# Patient Record
Sex: FEMALE | Race: WHITE
[De-identification: names, ages, dates, MRNs, and addresses within clinical notes are randomized per-mention and may not be internally consistent; named-entity substitution may affect disease eponyms.]

---

## 2022-08-28 ENCOUNTER — HOSPITAL ENCOUNTER (EMERGENCY)
Dept: HOSPITAL 97 - ER | Age: 22
Discharge: HOME | End: 2022-08-28
Payer: COMMERCIAL

## 2022-08-28 VITALS — TEMPERATURE: 98.8 F

## 2022-08-28 DIAGNOSIS — O20.0: Primary | ICD-10-CM

## 2022-08-28 DIAGNOSIS — Z3A.12: ICD-10-CM

## 2022-08-28 LAB
BUN BLD-MCNC: 13 MG/DL (ref 7–18)
GLUCOSE SERPLBLD-MCNC: 77 MG/DL (ref 74–106)
HCT VFR BLD CALC: 37.4 % (ref 36–45)
LYMPHOCYTES # SPEC AUTO: 2.3 K/UL (ref 0.7–4.9)
MCV RBC: 85.2 FL (ref 80–100)
PMV BLD: 7.2 FL (ref 7.6–11.3)
POTASSIUM SERPL-SCNC: 3.5 MMOL/L (ref 3.5–5.1)
RBC # BLD: 4.39 M/UL (ref 3.86–4.86)
SP GR UR: 1.02 (ref 1–1.03)

## 2022-08-28 PROCEDURE — 85025 COMPLETE CBC W/AUTO DIFF WBC: CPT

## 2022-08-28 PROCEDURE — 76817 TRANSVAGINAL US OBSTETRIC: CPT

## 2022-08-28 PROCEDURE — 84702 CHORIONIC GONADOTROPIN TEST: CPT

## 2022-08-28 PROCEDURE — 81003 URINALYSIS AUTO W/O SCOPE: CPT

## 2022-08-28 PROCEDURE — 99284 EMERGENCY DEPT VISIT MOD MDM: CPT

## 2022-08-28 PROCEDURE — 86900 BLOOD TYPING SEROLOGIC ABO: CPT

## 2022-08-28 PROCEDURE — 36415 COLL VENOUS BLD VENIPUNCTURE: CPT

## 2022-08-28 PROCEDURE — 86901 BLOOD TYPING SEROLOGIC RH(D): CPT

## 2022-08-28 PROCEDURE — 81025 URINE PREGNANCY TEST: CPT

## 2022-08-28 PROCEDURE — 80048 BASIC METABOLIC PNL TOTAL CA: CPT

## 2022-08-28 NOTE — XMS REPORT
Continuity of Care Document

                           Created on:2022



Patient:RAMILA LOPEZ

Sex:Female

:2000

External Reference #:[id number]





Demographics







                          Address                   [address]



                                                    [place] [address]

 

                          Mobile Phone              [phone]

 

                          Email Address             [email or web address]

 

                          Preferred Language        en

 

                          Marital Status            Single

 

                          Taoist Affiliation     Unknown

 

                          Race                      White

 

                          Ethnic Group              Not  or 









Author







                          Organization              [facility]

t

 

                          Address                   [address]



                                                     [address]

 

                          Phone                     [phone]









Support







                Name            Relationship    Address         Phone

 

                Gerardo Weeks    Other           Unavailable     [phone]









Care Team Providers







                    Name                Role                Phone

 

                    Kamilla Adan Primary Care Physician [phone]

 

                    Morales HIDALGO, Elham GARCIA Attending Clinician Unavailable

 

                    Naomi Cervantes Attending Clinician [phone]

 

                    Ultrasound, [facility] Attending Clinician Unavailable

 

                    Juana Caldera MD Attending Clinician [phone]

[phone]

 

                    JUANA CALDERA Attending Clinician Unavailable

 

                    KAMILLA REBOLLEDO Attending Clinician Unavailable

 

                    Kamilla Adan Attending Clinician [phone]

[phone]









Payers







           Payer Name Policy Type Policy Number Effective Date Expiration Date S

ource

 

           [employer]            [id number]  2022 00:00:00            







Problems







       Condition Condition Condition Status Onset  Resolution Last   Treating Co

mments 

Source



       Name   Details Category        Date   Date   Treatment Clinician        



                                                 Date                 

 

       Nausea and Nausea and Disease Active                              U

nivers



       vomiting vomiting               8-11                               ity of



       during during               00:00:                             [place]



       pregnancy pregnancy               00                                 [facility]

 

       GBS (group GBS (group Disease Active                       Overview

: Univers



       B      B                    7-18                        Formattin ity of



       streptococ streptococ               00:00:                      g of this

 [place]



       cus) UTI cus) UTI               00                          note   Medica

l



       complicati complicati                                           might be 

Branch



       ng     ng                                               different 



       pregnancy pregnancy                                           from the 



                                                               original. 



                                                               pending 



                                                               brittany    

 

       Supervisio Supervisio Disease Active                              U

nivers



       n of   n of                 7-14                               ity of



       high-risk high-risk               00:00:                             [place]

[place]



       pregnancy pregnancy               00                                 [facility]

 

       History of History of Disease Active                       Overview

: Univers



       depression depression               7-14                        Formattin

 ity of



                                   00:00:                      g of this [place]



                                                             note   Medical



                                                               might be Branch



                                                               different 



                                                               from the 



                                                               original. 



                                                               Not on 



                                                               meds,  



                                                               does not 



                                                               desires 



                                                               meds at 



                                                               this time 

 

       History of History of Disease Active                       Overview

: Univers



       anxiety anxiety               7-14                        Formattin ity o

f



                                   00:00:                      g of this [place]



                                                             note   Medical



                                                               might be Branch



                                                               different 



                                                               from the 



                                                               original. 



                                                               Not on 



                                                               meds,  



                                                               does not 



                                                               desire 



                                                               meds at 



                                                               this time 







Allergies, Adverse Reactions, Alerts







       Allergy Allergy Status Severity Reaction(s) Onset  Inactive Treating Comm

ents 

Source



       Name   Type                        Date   Date   Clinician        

 

       NO KNOWN Drug   Active                                           Univers



       ALLERGIE Class                                                   ity of



       S                                                              [facility]







Social History







           Social Habit Start Date Stop Date  Quantity   Comments   Source

 

           ASSERTION  2022            Pregnant              [facility]



                      00:00:00                                    [facility]

 

           History of                       User of smokeless            Univers

ity of



           tobacco use                       tobacco               [facility]

 

           Exposure to 2022 Not sure              [facility]



           SARS-CoV-2 00:00:00   09:43:00                         [facility]



           (event)                                                Branch

 

           Tobacco use and 2022 Former smokeless            Uni

versity of



           exposure   00:00:00   00:00:00   tobacco user            [facility]

l



                                                                  [place]

 

           Alcohol intake 2022 Ex-drinker            [facility]



                      00:00:00   00:00:00   (finding)             [facility]

 

           Sex Assigned At [birth year]                       Universit

y of



           Birth      00:00:00   00:00:00                         [facility]









                Smoking Status  Start Date      Stop Date       Source

 

                Never smoked tobacco                                 [facility]







Medications







       Ordered Filled Start  Stop   Current Ordering Indication Dosage Frequency

 Signature

                    Comments            Components          Source



     Medication Medication Date Date Medication? Clinician                (SIG) 

          



     Name Name                                                   

 

     proMETHazin            Yes       [id number] 25mg      Take 1           

Univers



     e 25 mg      8-11                               tablet by           ity of



     tablet      00:00:                               mouth           [place]



               00                                 every 6           Medical



                                                  (six)           Branch



                                                  hours as           



                                                  needed for           



                                                  Nausea and           



                                                  Vomiting           



                                                  (N/V).           

 

     proMETHazin            Yes       [id number] 25mg      Take 1           

Univers



     e 25 mg      8-11                               tablet by           ity of



     tablet      00:00:                               mouth           [place]



               00                                 every 6           Medical



                                                  (six)           Branch



                                                  hours as           



                                                  needed for           



                                                  Nausea and           



                                                  Vomiting           



                                                  (N/V).           

 

     proMETHazin      2022-0      Yes       [id number] 25mg      Take 1           

Univers



     e 25 mg      8-11                               tablet by           ity of



     tablet      00:00:                               mouth           [place]



               00                                 every 6           Medical



                                                  (six)           Branch



                                                  hours as           



                                                  needed for           



                                                  Nausea and           



                                                  Vomiting           



                                                  (N/V).           

 

     proMETHazin            Yes       [id number] 25mg      Take 1           

Univers



     e 25 mg      8-11                               tablet by           ity of



     tablet      00:00:                               mouth           [place]



               00                                 every 6           Medical



                                                  (six)           Branch



                                                  hours as           



                                                  needed for           



                                                  Nausea and           



                                                  Vomiting           



                                                  (N/V).           

 

     prenatal            Yes       [id number] 1{packe      Take 1           

Univers



     vit       7-25                     t}        Packet by           ity of



     33-iron-fol      00:00:                               mouth in           [place]

[place]



     ic-dha      00                                 the            Medical



     (SELECT-OB                                         morning.           Branc

h



     + DHA) 29                                                        



     mg iron-1                                                        



     mg -250 mg                                                        



     combo pack                                                        

 

     prenatal            Yes       [id number] 1{packe      Take 1           

Univers



     vit       7-25                     t}        Packet by           ity of



     33-iron-fol      00:00:                               mouth in           [place]

[place]



     ic-dha      00                                 the            Medical



     (SELECT-OB                                         morning.           Branc

h



     + DHA) 29                                                        



     mg iron-1                                                        



     mg -250 mg                                                        



     combo pack                                                        

 

     prenatal            Yes       [id number] 1{packe      Take 1           

Univers



     vit       7-25                     t}        Packet by           ity of



     33-iron-fol      00:00:                               mouth in           Te

[place]



     ic-dha      00                                 the            Medical



     (SELECT-OB                                         morning.           Branc

h



     + DHA) 29                                                        



     mg iron-1                                                        



     mg -250 mg                                                        



     combo pack                                                        

 

     prenatal            Yes       [id number] 1{packe      Take 1           

Univers



     vit       7-25                     t}        Packet by           ity of



     33-iron-fol      00:00:                               mouth in           Te

[place]



     ic-dha      00                                 the            Medical



     (SELECT-OB                                         morning.           Branc

h



     + DHA) 29                                                        



     mg iron-1                                                        



     mg -250 mg                                                        



     combo pack                                                        







Vital Signs







             Vital Name   Observation Time Observation Value Comments     Source

 

             Systolic blood 2022 14:42:00 116 mm[Hg]                Univer

sity of



             [facility]

 

             Diastolic blood 2022 14:42:00 72 mm[Hg]                 [facility]

[facility]



             [facility]

 

             Heart rate   2022 14:42:00 85 /min                   Universi

[facility]

 

             Body temperature 2022 14:42:00 36.17 Katty                 [facility]

 

             Respiratory rate 2022 14:42:00 18 /min                   [facility]

 

             Body height  2022 14:42:00 144.8 cm                  [facility]

 

             Body weight  2022 14:42:00 44.453 kg                 [facility]

 

             BMI          2022 14:42:00 21.21 kg/m2               [facility]







Procedures







                Procedure       Date / Time Performed Performing Clinician Sourc

e

 

                POCT URINALYSIS 2022 00:00:00 [facility]







Encounters







        Start   End     Encounter Admission Attending Care    Care    Encounter 

Source



        Date/Time Date/Time Type    Type    Clinicians Facility Department ID   

   

 

        2022-10-12 2022-10-12 Outpatient R               [facility]    [id number]

-20 Univers



        10:30:00 10:30:00                                         [id number]  [facility]

 

        2022-10-12 2022-10-12 Outpatient P               [facility]    [id number]

[id number] Univers



        10:30:00 10:30:00                                                 [facility]

 

        2022 Nurse           MICHAELA Nielsen    [id number]

21 Univers



        00:00:00 00:00:00 Triage          Elham ZAMORA   350.1.13.10       

  ity [facility] [id number]         Jose Miguel

as



                                                        [phone]         [address]

 

        2022 Abstract         [facility]    [id number]

[id number] Univers



        00:00:00 00:00:00                 Naomi FARMER OB/GYN  [id number]        

 ity of



                                                REGIONAL [id number]         [place] Miguel

as



                                                MATERNAL [phone]         Med

ical



                                                & CHILD [address] [place] [address]                 

 

        2022 Technician         Ultrasound, [facility]    [id number]

[id number] 

Univers



        09:15:00 09:45:00 Visit           Juana Caldera OB/GYN  350.1.

13.10         ity of



                                                REGIONAL [id number]         Jose Miguel

as



                                                MATERNAL [phone]         Med

ical



                                                & CHILD [address]             [facility]                 

 

        2022 Outpatient P       OMERE,  [facility]    [id number]

619 Univers



        09:15:00 09:29:35                 CHASEY                          ity of



                                                                        [facility]

 

        2022 Outpatient R       KESHA, [facility]    [id number]

[id number] Univers



        09:45:00 10:05:22                 KAMILLAELY jeffersy o

f



                                                                        [facility]

 

        2022 Routine         [place], [facility]    [id number]

[id number] Univers



        09:45:00 10:05:22 Prenatal         Kamilla C OB/GYN  350.1.13.10       

  ity of



                        Visit                   REGIONAL [id number]         Jose Miguel

as



                                                MATERNAL [phone]         Med

ical



                                                & [facility] [address]             [facility]                 







Results







           Test Description Test Time  Test Comments Results    Result Comments 

Source









                    POCT URINALYSIS W SPECIFIC GRAVITY 2022 14:51:00 









                      Test Item  Value      Reference Range Interpretation Comme

nts









             POCT U SP GRAV (test code = 3255) .            1.005-1.025         

      

 

             POCT PH U (test code = 3254) .            5-8                      

 

 

             POCT U LEUK EST (test code = 3263) .            Negative - Negative

              

 

             POCT U NIT (test code = 3262) .            Negative - Negative     

         

 

             POCT U PROT (test code = 3259) Trace        Negative - Negative    

          

 

             POCT U GLU (test code = 3256) Negative     Negative - Negative     

         

 

             POCT U KETONE (test code = 3258) .            Negative - Negative  

            

 

             POCT U UROBILI (test code = 3260) .            0.2-1               

      

 

             POCT U BILI (test code = 3261) .            Negative - Negative    

          

 

             POCT U BLD (test code = 3257) .            Negative - Negative     

         

 

             POCT U COLOR (test code = 3266) .                                  

    

 

             POCT U APPEAR (test code = 3267) .                                 

     



[facility]

## 2022-08-28 NOTE — ER
Nurse's Notes                                                                                     

 [facility]                                                                 

Name: Lilia Macedo                                                                               

Age: 22 yrs                                                                                       

Sex: Female                                                                                       

: 2000                                                                                   

MRN: [id number]                                                                                   

Arrival Date: 2022                                                                          

Time: 18:48                                                                                       

Account#: [id number]                                                                            

Bed 8                                                                                             

Private MD:                                                                                       

Diagnosis: Threatened                                                                     

                                                                                                  

Presentation:                                                                                     

                                                                                             

19:08 Chief complaint: Patient states: light spotting. 13 weeks pregnant. lower back pain     lg3 

      starting around 0800 this morning. last OB visit 22. Coronavirus screen: Client        

      denies travel out of the [place] in the last 14 days. At this time, the client does not        

      indicate any symptoms associated with coronavirus-19. Ebola Screen: No symptoms or          

      risks identified at this time. Initial Sepsis Screen:. Initial Sepsis Screen: Does the      

      patient meet any 2 criteria? No. Patient's initial sepsis screen is negative. Does the      

      patient have a suspected source of infection? No. Patient's initial sepsis screen is        

      negative. Risk Assessment: Do you want to hurt yourself or someone else? Patient            

      reports no desire to harm self or others. Onset of symptoms was 2022.            

19:08 Method Of Arrival: Ambulatory                                                           lg3 

19:08 Acuity: MADELEINE 3                                                                           lg3 

                                                                                                  

Triage Assessment:                                                                                

19:11 General: Appears in no apparent distress. comfortable, Behavior is calm, cooperative.   lg3 

      Pain: Complains of pain in back and pelvis. EENT: No deficits noted. No signs and/or        

      symptoms were reported regarding the EENT system. Neuro: No deficits noted. Level of        

      Consciousness is awake, alert, obeys commands, Oriented to person, place, time,             

      situation. Cardiovascular: No deficits noted. Denies chest pain, shortness of breath,       

      Capillary refill < 3 seconds Clubbing of nail beds is absent JVD is absent Patient's        

      skin is warm and dry. Respiratory: No deficits noted. Airway is patent Respiratory          

      effort is even, unlabored, Respiratory pattern is regular, symmetrical. GI: Abd is soft     

      X 4 quads Abd is non tender X 4 quads. : Reports vaginal bleeding that is spotty.         

      Derm: No deficits noted. No signs and/or symptoms reported regarding the dermatologic       

      system. Skin is intact, is healthy with good turgor, Skin is dry, Skin temperature is       

      warm. Musculoskeletal: No deficits noted. No signs and/or symptoms reported regarding       

      the musculoskeletal system. Circulation, motion, and sensation intact. Range of motion:     

      intact in all extremities.                                                                  

                                                                                                  

OB/GYN:                                                                                           

19:11 LMP 2022                                                                              lg3 

23:25  2,  0, Living 1, LMP 2022                                             kb  

                                                                                                  

Historical:                                                                                       

- Allergies:                                                                                      

19:11 No Known Allergies;                                                                     lg3 

- Home Meds:                                                                                      

19:11 Prenatal Vitamin Oral tab [Active]; Folic Acid Oral [Active];                           lg3 

- PMHx:                                                                                           

19:11 None;                                                                                   lg3 

- PSHx:                                                                                           

19:11 None;                                                                                   lg3 

                                                                                                  

- Immunization history:: Adult Immunizations up to date, Client reports having NOT                

  received the Covid vaccine.                                                                     

- Social history:: Smoking status: Patient/guardian denies using tobacco,                         

  Patient/guardian denies using alcohol, street drugs, IV drugs.                                  

                                                                                                  

                                                                                                  

Screenin:25 Abuse screen: Denies threats or abuse. Denies injuries from another. Nutritional        tw5 

      screening: No deficits noted. Tuberculosis screening: No symptoms or risk factors           

      identified. Fall Risk None identified.                                                      

                                                                                                  

Assessment:                                                                                       

19:25 General: Appears in no apparent distress. Behavior is calm, cooperative, appropriate    tw5 

      for age. General: Reports "I started bleeding around 8 this morning so I called my          

      clinic but they aren't open so they told me if the bleeding continued to come to the        

      ER. I have had spotting off and on all day, I am just really worried.". Pain: Complains     

      of pain in left lower quadrant Pain currently is 4 out of 10 on a pain scale. Pain:         

      Quality of pain is described as crampy. Cardiovascular: No deficits noted. Respiratory:     

      No deficits noted.                                                                          

20:19 Reassessment: Patient appears in no apparent distress at this time. Patient and/or      hb  

      family updated on plan of care and expected duration. Pain level reassessed. Patient is     

      alert, oriented x 3, equal unlabored respirations, skin warm/dry/pink.                      

21:45 Reassessment: Patient appears in no apparent distress at this time. Patient and/or      hb  

      family updated on plan of care and expected duration. Pain level reassessed. Patient is     

      alert, oriented x 3, equal unlabored respirations, skin warm/dry/pink.                      

                                                                                                  

Vital Signs:                                                                                      

19:08  / 69; Pulse 87; Resp 17; Temp 98.8(O); Pulse Ox 100% on R/A; Weight 44.45 kg     lg3 

      (R); Height 4 ft. 8 in. (142.24 cm) (R);                                                    

19:25  / 92; Pulse 88; Resp 18; Pulse Ox 99% on R/A; Pain 4/10;                         tw5 

20:18  / 66; Pulse 87; Resp 16; Pulse Ox 100% on R/A;                                   hb  

21:46  / 80; Pulse 79; Resp 14; Pulse Ox 99% ;                                          hb  

19:08 Body Mass Index 21.97 (44.45 kg, 142.24 cm)                                             lg3 

                                                                                                  

ED Course:                                                                                        

18:48 Patient arrived in ED.                                                                  as  

18:49 Neelam Medellin FNP-C is PHCP.                                                        kb  

18:49 Dennis Corbett MD is Attending Physician.                                                kb  

19:11 Triage completed.                                                                       lg3 

19:11 Arm band placed on left wrist.                                                          lg3 

19:25 Shabana Calvin is Primary Nurse.                                                         tw5 

19:25 Awaiting lab results.                                                                   tw5 

19:25 Patient has correct armband on for positive identification. Bed in low position. Call   tw5 

      light in reach. Side rails up X 1. Pulse ox on. NIBP on. Door closed. Noise minimized.      

      Moved to private room. Pillow given. Verbal reassurance given.                              

19:49 Inserted saline lock: 20 gauge in left antecubital area, using aseptic technique.       mw1 

19:50 Initial lab(s) drawn, by me, sent to lab.                                               mw1 

20:24 Quantitative Hcg Sent.                                                                  tw5 

20:24 Abo/rh Typing Sent.                                                                     tw5 

21:01 US Transvaginal Ob In Process Unspecified.                                              EDMS

                                                                                                  

Administered Medications:                                                                         

No medications were administered                                                                  

                                                                                                  

                                                                                                  

Outcome:                                                                                          

21:58 Discharge ordered by MD.                                                                kb  

22:22 Patient left the ED.                                                                      

                                                                                                  

Signatures:                                                                                       

Dispatcher [employer]                           EDMS                                                 

Neelam Medellin FNP-C FNP-Ckb Martinez, Amelia as Baxter, Heather, RN                     RN   Tobi Nunez                               1                                                  

Silvia Miranda, ROCKY                       RN   3                                                  

Tesha Hightower                                                                                    

Shabana Calvin                                tw5                                                  

                                                                                                  

**************************************************************************************************

## 2022-08-28 NOTE — RAD REPORT
EXAM DESCRIPTION:  US - Transvaginal OB - 8/28/2022 8:59 pm

 

CLINICAL HISTORY:  Vaginal bleeding

 

COMPARISON:  None.

 

TECHNIQUE:  Ultrasound obtained of the pregnant uterus

 

FINDINGS:  Single IUP identified. Fetal heart tones are present. Fetal heart rate is measured at 156 
beats/minute. A yolk sac is identified measuring 5 millimeters. Echogenic band within the gestational
 sac.

 

Fetal measurements are as follows:

 

BPD:1.0 centimeters

HC:6.9 Centimeters 12 week 5 day

AC:5.5 Centimeters 12 week 2 day

HL:0.8 Centimeters

FL:0.7 Centimeters 12 week 1 day

 

The estimated gestational age (EGA) is 12 week 2 day with an DEBBY of 03/10/2023. Posterior placenta.

 

IMPRESSION:  Single IUP with positive fetal heart tones measuring 12.2 day with DEBBY of 03/10/2023. Th
in echogenic band within the gestational sac is probably of little clinical significance. This can be
 reassessed on the patient's routine fetal anatomy scan.

## 2022-08-28 NOTE — EDPHYS
Physician Documentation                                                                           

 [facility]                                                                 

Name: Lilia Macedo                                                                               

Age: 22 yrs                                                                                       

Sex: Female                                                                                       

: 2000                                                                                   

MRN: [id number]                                                                                   

Arrival Date: 2022                                                                          

Time: 18:48                                                                                       

Account#: [id number]                                                                            

Bed 8                                                                                             

Private MD:                                                                                       

ED Physician Dennis Corbett                                                                         

HPI:                                                                                              

                                                                                             

23:25 This 22 yrs old Female presents to ER via Ambulatory with complaints of Vaginal         kb  

      Bleeding - 13 wks preg, Pelvic Pain - cramping.                                             

23:25 The patient presents to the emergency department with abdominal pain, described as      kb  

      crampy, vaginal bleeding, described as spotting. The estimated gestational age is 12        

      weeks. Pregnancy course: Prenatal care: at a clinic, Leakage of Fluid: none                 

      appreciated, Ultrasound: the patient had an ultrasound, which was normal,                   

      Risk/complications: no obvious risks or complications are appreciated. Previous             

      pregnancies: in previous pregnancies patient has had. Associated signs and symptoms:        

      Pertinent positives: vaginal bleeding. The patient has not experienced similar symptoms     

      in the past. The patient has not recently seen a physician. Patient reports vaginal         

      spotting and lower abdominal cramping that started after intercourse this morning..         

                                                                                                  

OB/GYN:                                                                                           

19:11 LMP 2022                                                                              lg3 

23:25  2,  0, Living 1, LMP 2022                                             kb  

                                                                                                  

Historical:                                                                                       

- Allergies:                                                                                      

19:11 No Known Allergies;                                                                     lg3 

- Home Meds:                                                                                      

19:11 Prenatal Vitamin Oral tab [Active]; Folic Acid Oral [Active];                           lg3 

- PMHx:                                                                                           

19:11 None;                                                                                   lg3 

- PSHx:                                                                                           

19:11 None;                                                                                   lg3 

                                                                                                  

- Immunization history:: Adult Immunizations up to date, Client reports having NOT                

  received the Covid vaccine.                                                                     

- Social history:: Smoking status: Patient/guardian denies using tobacco,                         

  Patient/guardian denies using alcohol, street drugs, IV drugs.                                  

                                                                                                  

                                                                                                  

ROS:                                                                                              

23:24 Constitutional: Negative for fever, chills, and weight loss.                            kb  

23:24 Abdomen/GI: Positive for abdominal cramps.                                                  

23:24 : Positive for vaginal bleeding.                                                          

23:24 All other systems are negative.                                                             

                                                                                                  

Exam:                                                                                             

23:24 Constitutional:  This is a well developed, well nourished patient who is awake, alert,  kb  

      and in no acute distress. Head/Face:  Normocephalic, atraumatic. ENT:  Moist Mucous         

      membranes Cardiovascular:  Regular rate and rhythm with a normal S1 and S2.  No             

      gallops, murmurs, or rubs.  No pulse deficits. Respiratory:  Respirations even and          

      unlabored. No increased work of breathing. Talking in full sentences Abdomen/GI:  Soft,     

      non-tender. No distention Skin:  Warm, dry with normal turgor.  Normal color. MS/           

      Extremity:  Pulses equal, no cyanosis.  Neurovascular intact.  Full, normal range of        

      motion. Neuro:  Awake and alert, GCS 15, oriented to person, place, time, and               

      situation. Moves all extremities. Normal gait. Psych:  Awake, alert, with orientation       

      to person, place and time.  Behavior, mood, and affect are within normal limits.            

                                                                                                  

Vital Signs:                                                                                      

19:08  / 69; Pulse 87; Resp 17; Temp 98.8(O); Pulse Ox 100% on R/A; Weight 44.45 kg     lg3 

      (R); Height 4 ft. 8 in. (142.24 cm) (R);                                                    

19:25  / 92; Pulse 88; Resp 18; Pulse Ox 99% on R/A; Pain 4/10;                         tw5 

20:18  / 66; Pulse 87; Resp 16; Pulse Ox 100% on R/A;                                   hb  

21:46  / 80; Pulse 79; Resp 14; Pulse Ox 99% ;                                          hb  

19:08 Body Mass Index 21.97 (44.45 kg, 142.24 cm)                                             lg3 

                                                                                                  

MDM:                                                                                              

19:11 Patient medically screened.                                                             kb  

23:24 Data reviewed: vital signs, nurses notes. Data interpreted: Pulse oximetry: on room air kb  

      is 99 %. Interpretation: normal. Counseling: I had a detailed discussion with the           

      patient and/or guardian regarding: the historical points, exam findings, and any            

      diagnostic results supporting the discharge/admit diagnosis, lab results, radiology         

      results, the need for outpatient follow up, an OB/Gyne specialist, to return to the         

      emergency department if symptoms worsen or persist or if there are any questions or         

      concerns that arise at home.                                                                

                                                                                                  

                                                                                             

19:12 Order name: Abo/rh Typing; Complete Time: 20:36                                         kb  

                                                                                             

19:12 Order name: Basic Metabolic Panel; Complete Time: 20:42                                 kb  

                                                                                             

19:12 Order name: CBC with Diff; Complete Time: 20:08                                         kb  

                                                                                             

19:12 Order name: Quantitative Hcg; Complete Time: 20:42                                      kb  

                                                                                             

19:23 Order name: Urine Dipstick-Ancillary; Complete Time: 19:27                              EDMS

                                                                                             

19:27 Order name: Urine Pregnancy--Ancillary (enter results); Complete Time: 19:45            wm  

                                                                                             

19:12 Order name: IV Saline Lock; Complete Time: 19:45                                        kb  

                                                                                             

19:12 Order name: Labs collected and sent; Complete Time: 19:45                               kb  

                                                                                             

19:12 Order name: NPO; Complete Time: 19:33                                                   kb  

                                                                                             

19:12 Order name: Urine Dipstick-Ancillary (obtain specimen); Complete Time: 19:45            kb  

                                                                                             

19:12 Order name: Urine Pregnancy Test (obtain specimen); Complete Time: 19:45                kb  

                                                                                             

19:12 Order name: US Transvaginal Ob; Complete Time: 21:21                                    kb  

                                                                                                  

Administered Medications:                                                                         

No medications were administered                                                                  

                                                                                                  

                                                                                                  

Disposition:                                                                                      

                                                                                             

07:11 Co-signature as Attending Physician, Dennis Corbett MD.                                    rn  

                                                                                                  

Disposition Summary:                                                                              

22 21:58                                                                                    

Discharge Ordered                                                                                 

      Location: Home                                                                          kb  

      Condition: Stable                                                                       kb  

      Diagnosis                                                                                   

        - Threatened                                                                  kb  

      Followup:                                                                               kb  

        - With: Emergency Department                                                               

        - When: As needed                                                                          

        - Reason: Worsening of condition                                                           

      Followup:                                                                               kb  

        - With: Private Physician                                                                  

        - When: 2 - 3 days                                                                         

        - Reason: Recheck today's complaints, Continuance of care, Re-evaluation by your           

      physician                                                                                   

      Discharge Instructions:                                                                     

        - Discharge Summary Sheet                                                             kb  

        - Threatened Miscarriage, Easy-to-Read                                                kb  

        - Vaginal Bleeding During Pregnancy, First Trimester, Easy-to-Read                    kb  

      Forms:                                                                                      

        - Medication Reconciliation Form                                                      kb  

        - Thank You Letter                                                                    kb  

        - Antibiotic Education                                                                kb  

        - Prescription Opioid Use                                                             kb  

Signatures:                                                                                       

Dispatcher [employer]                           Neelam Olmos, FNP-C                 FNP-Ckb                                                   

Dennis Corbett MD MD rn Gibson, Lacie, RN                       RN   lg3                                                  

                                                                                                  

**************************************************************************************************

## 2022-08-29 VITALS — OXYGEN SATURATION: 99 % | DIASTOLIC BLOOD PRESSURE: 80 MMHG | SYSTOLIC BLOOD PRESSURE: 122 MMHG
